# Patient Record
Sex: MALE | Race: WHITE | NOT HISPANIC OR LATINO | URBAN - METROPOLITAN AREA
[De-identification: names, ages, dates, MRNs, and addresses within clinical notes are randomized per-mention and may not be internally consistent; named-entity substitution may affect disease eponyms.]

---

## 2018-11-09 ENCOUNTER — EMERGENCY (EMERGENCY)
Facility: HOSPITAL | Age: 33
LOS: 0 days | Discharge: HOME | End: 2018-11-09
Attending: STUDENT IN AN ORGANIZED HEALTH CARE EDUCATION/TRAINING PROGRAM | Admitting: EMERGENCY MEDICINE
Payer: COMMERCIAL

## 2018-11-09 VITALS
TEMPERATURE: 99 F | DIASTOLIC BLOOD PRESSURE: 60 MMHG | SYSTOLIC BLOOD PRESSURE: 113 MMHG | HEART RATE: 72 BPM | RESPIRATION RATE: 17 BRPM | OXYGEN SATURATION: 97 %

## 2018-11-09 VITALS
HEART RATE: 96 BPM | WEIGHT: 199.96 LBS | DIASTOLIC BLOOD PRESSURE: 76 MMHG | HEIGHT: 72 IN | RESPIRATION RATE: 20 BRPM | TEMPERATURE: 98 F | OXYGEN SATURATION: 98 % | SYSTOLIC BLOOD PRESSURE: 123 MMHG

## 2018-11-09 DIAGNOSIS — L05.91 PILONIDAL CYST WITHOUT ABSCESS: ICD-10-CM

## 2018-11-09 LAB
ABO RH CONFIRMATION: SIGNIFICANT CHANGE UP
ALBUMIN SERPL ELPH-MCNC: 4 G/DL — SIGNIFICANT CHANGE UP (ref 3.3–5)
ALP SERPL-CCNC: 84 U/L — SIGNIFICANT CHANGE UP (ref 40–120)
ALT FLD-CCNC: 25 U/L — SIGNIFICANT CHANGE UP (ref 12–78)
ANION GAP SERPL CALC-SCNC: 6 MMOL/L — SIGNIFICANT CHANGE UP (ref 5–17)
APTT BLD: 36.3 SEC — SIGNIFICANT CHANGE UP (ref 27.5–36.3)
AST SERPL-CCNC: 13 U/L — LOW (ref 15–37)
BASOPHILS # BLD AUTO: 0.06 K/UL — SIGNIFICANT CHANGE UP (ref 0–0.2)
BASOPHILS NFR BLD AUTO: 0.4 % — SIGNIFICANT CHANGE UP (ref 0–2)
BILIRUB SERPL-MCNC: 0.9 MG/DL — SIGNIFICANT CHANGE UP (ref 0.2–1.2)
BLD GP AB SCN SERPL QL: SIGNIFICANT CHANGE UP
BUN SERPL-MCNC: 10 MG/DL — SIGNIFICANT CHANGE UP (ref 7–23)
CALCIUM SERPL-MCNC: 9.4 MG/DL — SIGNIFICANT CHANGE UP (ref 8.5–10.1)
CHLORIDE SERPL-SCNC: 106 MMOL/L — SIGNIFICANT CHANGE UP (ref 96–108)
CO2 SERPL-SCNC: 25 MMOL/L — SIGNIFICANT CHANGE UP (ref 22–31)
CREAT SERPL-MCNC: 1.42 MG/DL — HIGH (ref 0.5–1.3)
EOSINOPHIL # BLD AUTO: 0.19 K/UL — SIGNIFICANT CHANGE UP (ref 0–0.5)
EOSINOPHIL NFR BLD AUTO: 1.2 % — SIGNIFICANT CHANGE UP (ref 0–6)
GLUCOSE SERPL-MCNC: 99 MG/DL — SIGNIFICANT CHANGE UP (ref 70–99)
HCT VFR BLD CALC: 43.6 % — SIGNIFICANT CHANGE UP (ref 39–50)
HGB BLD-MCNC: 15.5 G/DL — SIGNIFICANT CHANGE UP (ref 13–17)
IMM GRANULOCYTES NFR BLD AUTO: 0.5 % — SIGNIFICANT CHANGE UP (ref 0–1.5)
INR BLD: 1.14 RATIO — SIGNIFICANT CHANGE UP (ref 0.88–1.16)
LACTATE SERPL-SCNC: 1.3 MMOL/L — SIGNIFICANT CHANGE UP (ref 0.7–2)
LYMPHOCYTES # BLD AUTO: 20.2 % — SIGNIFICANT CHANGE UP (ref 13–44)
LYMPHOCYTES # BLD AUTO: 3.14 K/UL — SIGNIFICANT CHANGE UP (ref 1–3.3)
MCHC RBC-ENTMCNC: 30.9 PG — SIGNIFICANT CHANGE UP (ref 27–34)
MCHC RBC-ENTMCNC: 35.6 GM/DL — SIGNIFICANT CHANGE UP (ref 32–36)
MCV RBC AUTO: 87 FL — SIGNIFICANT CHANGE UP (ref 80–100)
MONOCYTES # BLD AUTO: 1.51 K/UL — HIGH (ref 0–0.9)
MONOCYTES NFR BLD AUTO: 9.7 % — SIGNIFICANT CHANGE UP (ref 2–14)
NEUTROPHILS # BLD AUTO: 10.53 K/UL — HIGH (ref 1.8–7.4)
NEUTROPHILS NFR BLD AUTO: 68 % — SIGNIFICANT CHANGE UP (ref 43–77)
NRBC # BLD: 0 /100 WBCS — SIGNIFICANT CHANGE UP (ref 0–0)
PLATELET # BLD AUTO: 301 K/UL — SIGNIFICANT CHANGE UP (ref 150–400)
POTASSIUM SERPL-MCNC: 3.8 MMOL/L — SIGNIFICANT CHANGE UP (ref 3.5–5.3)
POTASSIUM SERPL-SCNC: 3.8 MMOL/L — SIGNIFICANT CHANGE UP (ref 3.5–5.3)
PROT SERPL-MCNC: 8.3 GM/DL — SIGNIFICANT CHANGE UP (ref 6–8.3)
PROTHROM AB SERPL-ACNC: 12.7 SEC — SIGNIFICANT CHANGE UP (ref 10–12.9)
RBC # BLD: 5.01 M/UL — SIGNIFICANT CHANGE UP (ref 4.2–5.8)
RBC # FLD: 11.9 % — SIGNIFICANT CHANGE UP (ref 10.3–14.5)
SODIUM SERPL-SCNC: 137 MMOL/L — SIGNIFICANT CHANGE UP (ref 135–145)
TYPE + AB SCN PNL BLD: SIGNIFICANT CHANGE UP
WBC # BLD: 15.51 K/UL — HIGH (ref 3.8–10.5)
WBC # FLD AUTO: 15.51 K/UL — HIGH (ref 3.8–10.5)

## 2018-11-09 PROCEDURE — 74177 CT ABD & PELVIS W/CONTRAST: CPT | Mod: 26

## 2018-11-09 PROCEDURE — 99284 EMERGENCY DEPT VISIT MOD MDM: CPT | Mod: 25

## 2018-11-09 PROCEDURE — XXXXX: CPT

## 2018-11-09 PROCEDURE — 10080 I&D PILONIDAL CYST SIMPLE: CPT

## 2018-11-09 RX ORDER — METRONIDAZOLE 500 MG
500 TABLET ORAL ONCE
Qty: 0 | Refills: 0 | Status: COMPLETED | OUTPATIENT
Start: 2018-11-09 | End: 2018-11-09

## 2018-11-09 RX ORDER — SODIUM CHLORIDE 9 MG/ML
1000 INJECTION INTRAMUSCULAR; INTRAVENOUS; SUBCUTANEOUS ONCE
Qty: 0 | Refills: 0 | Status: COMPLETED | OUTPATIENT
Start: 2018-11-09 | End: 2018-11-09

## 2018-11-09 RX ORDER — OXYCODONE AND ACETAMINOPHEN 5; 325 MG/1; MG/1
1 TABLET ORAL ONCE
Qty: 0 | Refills: 0 | Status: DISCONTINUED | OUTPATIENT
Start: 2018-11-09 | End: 2018-11-09

## 2018-11-09 RX ORDER — MORPHINE SULFATE 50 MG/1
4 CAPSULE, EXTENDED RELEASE ORAL ONCE
Qty: 0 | Refills: 0 | Status: DISCONTINUED | OUTPATIENT
Start: 2018-11-09 | End: 2018-11-09

## 2018-11-09 RX ADMIN — Medication 500 MILLIGRAM(S): at 08:22

## 2018-11-09 RX ADMIN — Medication 100 MILLIGRAM(S): at 07:22

## 2018-11-09 RX ADMIN — SODIUM CHLORIDE 1000 MILLILITER(S): 9 INJECTION INTRAMUSCULAR; INTRAVENOUS; SUBCUTANEOUS at 07:30

## 2018-11-09 RX ADMIN — MORPHINE SULFATE 4 MILLIGRAM(S): 50 CAPSULE, EXTENDED RELEASE ORAL at 06:26

## 2018-11-09 RX ADMIN — MORPHINE SULFATE 4 MILLIGRAM(S): 50 CAPSULE, EXTENDED RELEASE ORAL at 09:11

## 2018-11-09 RX ADMIN — SODIUM CHLORIDE 1000 MILLILITER(S): 9 INJECTION INTRAMUSCULAR; INTRAVENOUS; SUBCUTANEOUS at 07:07

## 2018-11-09 RX ADMIN — MORPHINE SULFATE 4 MILLIGRAM(S): 50 CAPSULE, EXTENDED RELEASE ORAL at 06:40

## 2018-11-09 RX ADMIN — SODIUM CHLORIDE 1000 MILLILITER(S): 9 INJECTION INTRAMUSCULAR; INTRAVENOUS; SUBCUTANEOUS at 06:26

## 2018-11-09 RX ADMIN — SODIUM CHLORIDE 1000 MILLILITER(S): 9 INJECTION INTRAMUSCULAR; INTRAVENOUS; SUBCUTANEOUS at 07:10

## 2018-11-09 RX ADMIN — SODIUM CHLORIDE 1000 MILLILITER(S): 9 INJECTION INTRAMUSCULAR; INTRAVENOUS; SUBCUTANEOUS at 08:40

## 2018-11-09 RX ADMIN — MORPHINE SULFATE 4 MILLIGRAM(S): 50 CAPSULE, EXTENDED RELEASE ORAL at 09:26

## 2018-11-09 NOTE — ED PROCEDURE NOTE - PROCEDURE ADDITIONAL DETAILS
Left side  buttock Pilonidal Cyst: Indurated and fluctuant,  Betadine prep then saline, Lido 1% with epi infiltrated , approximately 2.5 cm incision with #11 blade large amount of pus and bloody drainage, forcep used to break up loculations , 1/2 inch Iodoform packing inserted, with DSD applied. MTangredi NP

## 2018-11-09 NOTE — ED ADULT NURSE REASSESSMENT NOTE - NS ED NURSE REASSESS COMMENT FT1
report received from previous RN. patient laying comfortably on his side in stretcher in no acute distress. A&Ox4. complains of left upper buttock draining abscess. denies any fevers. afebrile at this time. other VSS. color good. skin warm and dry. respirations even and unlabored. states pain has improved after receiving medication - now a 7/10 - patient states "I was crying when I got here and now I'm not so that's a good sign". awaiting CT results. patient remains NPO. blood cultures previously drawn and IV antibiotics started as ordered.

## 2018-11-09 NOTE — ED ADULT NURSE REASSESSMENT NOTE - NS ED NURSE REASSESS COMMENT FT1
patient cleared for discharge. refused d/c VS. irena removed. pressure dressing applied. site benign. discharge instructions provided to patient who verbalized understanding. instructed to follow up with PMD. educated about new medication and verbalized understanding. patient ambulated out with a steady gait with all belongings in no acute distress.

## 2018-11-09 NOTE — ED ADULT NURSE NOTE - OBJECTIVE STATEMENT
Pt presents to ED complaining of worsening pain in left buttocks/tailbone due to cyst reoccurrence. Pt states he had last cyst drained when he was 19 years old out patient.  Pt states he had appointment with dermatologist this am but pain became unbearable and could not sit on coccyx. Pt presents to ED complaining of worsening pain in left buttocks/tailbone due to cyst reoccurrence. Pt states he had last cyst drained when he was 19 years old out patient.  Pt states he had appointment with dermatologist this am but pain became unbearable and could not sit on coccyx.  Denies fever, chills.  Did not take any pain medication PTA

## 2018-11-09 NOTE — ED PROVIDER NOTE - SKIN, MLM
Skin normal color for race, warm, dry and intact. (+) 5x2cm gluteal abscess with surrounding cellulitis

## 2018-11-09 NOTE — CONSULT NOTE ADULT - SUBJECTIVE AND OBJECTIVE BOX
Pt is a 34 yo male with hx of pilonidal cyst at age 16; patient reports always has had "cyst" in that area but worsening in last "36 hours" per patient; patient was going to see dermatologist this am but could not get into car due to pain; did not take anything prior to arrival for pain; no fever or chills; no drainage from site; no abdominal pain; no urinary complaints; no testicular pain; no rectal bleeding; no allergies to medications.    pt admits to driving around town yesterday for approximately 10 hours.  He states that he had the cyst drained once before when he was 19 years old.      PMH: pilonidal cyst   PSH: I&d of pilonidal cyst   ALL: NKDA     VITALS:   ICU Vital Signs Last 24 Hrs  T(C): 37.4 (09 Nov 2018 07:26), Max: 37.4 (09 Nov 2018 07:26)  T(F): 99.4 (09 Nov 2018 07:26), Max: 99.4 (09 Nov 2018 07:26)  HR: 72 (09 Nov 2018 07:26) (72 - 96)  BP: 113/60 (09 Nov 2018 07:26) (113/60 - 123/76)  BP(mean): --  ABP: --  ABP(mean): --  RR: 17 (09 Nov 2018 07:26) (17 - 20)  SpO2: 97% (09 Nov 2018 07:26) (97% - 98%)                            15.5   15.51 )-----------( 301      ( 09 Nov 2018 06:24 )             43.6     11-09    137  |  106  |  10  ----------------------------<  99  3.8   |  25  |  1.42<H>    Ca    9.4      09 Nov 2018 06:24    TPro  8.3  /  Alb  4.0  /  TBili  0.9  /  DBili  x   /  AST  13<L>  /  ALT  25  /  AlkPhos  84  11-09      PE:   NEURO: aox3, appears stated age, WDWN   CV: S1S2, RRR   PULM: bilateral air entry, non-labored breathing   ABD: soft ND, NTTD,   EXT: FROM, WWP, pilonidal cyst approximately 3-4 cm in diameter, with areas of fluctuance, erythematous, mild drainage      < from: CT Abdomen and Pelvis w/ IV Cont (11.09.18 @ 07:07) >  EXAM:  CT ABDOMEN AND PELVIS IC                            PROCEDURE DATE:  11/09/2018  IMPRESSION:    Superficial medial left subcutaneous gluteal cleft 3.4 x 4.0 x 4.7 cm   abscess, posterior to the coccyx. No CT evidence for osteomyelitis.       < end of copied text > Pt is a 34 yo male with hx of pilonidal cyst at age 16; patient reports always has had "cyst" in that area but worsening in last "36 hours" per patient; patient was going to see dermatologist this am but could not get into car due to pain; did not take anything prior to arrival for pain; no fever or chills; no drainage from site; no abdominal pain; no urinary complaints; no testicular pain; no rectal bleeding; no allergies to medications.    pt admits to driving around town yesterday for approximately 10 hours.  He states that he had the cyst drained once before when he was 19 years old.    ROS:.  [X] A ten-point review of systems was otherwise negative except as noted.  Systemic:	[ ] Fever	[ ] Chills	[ ] Night sweats    [ ] Fatigue	[ ] Other  [] Cardiovascular:  [] Pulmonary:  [] Renal/Urologic:  [] Gastrointestinal: abdominal pain, vomiting  [] Metabolic:  [] Neurologic:  [] Hematologic:  [] ENT:  [] Ophthalmologic:  [] Musculoskeletal:    [ ] Due to altered mental status/intubation, subjective information were not able to be obtained from the patient. History was obtained, to the extent possible, from review of the chart and collateral sources of information.    PMH: pilonidal cyst   PSH: I&d of pilonidal cyst   ALL: NKDA     VITALS:   ICU Vital Signs Last 24 Hrs  T(C): 37.4 (09 Nov 2018 07:26), Max: 37.4 (09 Nov 2018 07:26)  T(F): 99.4 (09 Nov 2018 07:26), Max: 99.4 (09 Nov 2018 07:26)  HR: 72 (09 Nov 2018 07:26) (72 - 96)  BP: 113/60 (09 Nov 2018 07:26) (113/60 - 123/76)  BP(mean): --  ABP: --  ABP(mean): --  RR: 17 (09 Nov 2018 07:26) (17 - 20)  SpO2: 97% (09 Nov 2018 07:26) (97% - 98%)       Labs:                      15.5   15.51 )-----------( 301      ( 09 Nov 2018 06:24 )             43.6     11-09    137  |  106  |  10  ----------------------------<  99  3.8   |  25  |  1.42<H>    Ca    9.4      09 Nov 2018 06:24    TPro  8.3  /  Alb  4.0  /  TBili  0.9  /  DBili  x   /  AST  13<L>  /  ALT  25  /  AlkPhos  84  11-09      PE:   NEURO: aox3, appears stated age, WDWN   CV: S1S2, RRR   PULM: bilateral air entry, non-labored breathing   ABD: soft ND, NTTD,   EXT: FROM, WWP, pilonidal cyst approximately 3-4 cm in diameter, with areas of fluctuance, erythematous, mild drainage      < from: CT Abdomen and Pelvis w/ IV Cont (11.09.18 @ 07:07) >  EXAM:  CT ABDOMEN AND PELVIS IC                            PROCEDURE DATE:  11/09/2018  IMPRESSION:    Superficial medial left subcutaneous gluteal cleft 3.4 x 4.0 x 4.7 cm   abscess, posterior to the coccyx. No CT evidence for osteomyelitis.       < end of copied text >

## 2018-11-09 NOTE — ED PROVIDER NOTE - PROGRESS NOTE DETAILS
Sachi DO: Patient now with leukocytosis; HR>90; SIRs; sepsis protocol initiated; blood cultures x 2, lactate added; fluids, antibiotics ordered. Sachi DO: Pending CT scan read at this time- surgery consulted pending CT read Sachi DO: I&D performed by PHILIP Caceres; f/u with Dr. Santana next week; sitz baths, antibiotics; pain control; strict return precautions given.

## 2018-11-09 NOTE — CONSULT NOTE ADULT - ATTENDING COMMENTS
Pt seen and examined, agree with above, Pt offered drainage with sedation , he wants local anesthesia and don't want to stay in hospital   ER to drain the collection   daily packing  sitz baths   oral antibiotics  Follow up in office in 1 week.

## 2018-11-09 NOTE — ED ADULT TRIAGE NOTE - CHIEF COMPLAINT QUOTE
c/o painful cyst on tailbone increasing in size. was supposed to see dermatologist this morning but wasn't able to get into car because of his pain. pt with hx of cyst.

## 2018-11-09 NOTE — CONSULT NOTE ADULT - ASSESSMENT
pt is a 34 yo male with pilonidal cyst, infected     -recommend incision and drainage with packing   -recommend daily sitz baths, multiple times a day if possible   -10 day course of abx is suggested   -pt can follow up in surgical clinic next Thursday   -explained benefit of incision and drainage, pt is agreeable to drainage       Case discussed with Dr. Santana

## 2018-11-09 NOTE — ED PROVIDER NOTE - OBJECTIVE STATEMENT
Patient is a 34 yo male with hx of pilonidal cyst at age 16; patient reports always has had "cyst" in that area but worsening in last "36 hours" per patient; patient was going to see dermatologist this am but could not get into car due to pain; did not take anything prior to arrival for pain; no fever or chills; no drainage from site; no abdominal pain; no urinary complaints; no testicular pain; no rectal bleeding; no allergies to medications.

## 2018-11-09 NOTE — ED ADULT NURSE REASSESSMENT NOTE - NS ED NURSE REASSESS COMMENT FT1
patient laying in stretcher. appears uncomfortable. becoming anxious. patient states pain medication helped for a little but now pain is back. patient states "I don't know why it's taking so long". patient awaiting bedside I&D by MD. Dr. Karimi aware.

## 2018-11-14 LAB
CULTURE RESULTS: SIGNIFICANT CHANGE UP
CULTURE RESULTS: SIGNIFICANT CHANGE UP
SPECIMEN SOURCE: SIGNIFICANT CHANGE UP
SPECIMEN SOURCE: SIGNIFICANT CHANGE UP

## 2020-06-17 PROBLEM — Z00.00 ENCOUNTER FOR PREVENTIVE HEALTH EXAMINATION: Status: ACTIVE | Noted: 2020-06-17

## 2020-06-26 ENCOUNTER — APPOINTMENT (OUTPATIENT)
Dept: GASTROENTEROLOGY | Facility: CLINIC | Age: 35
End: 2020-06-26
Payer: SELF-PAY

## 2020-06-26 VITALS
DIASTOLIC BLOOD PRESSURE: 82 MMHG | WEIGHT: 230 LBS | HEART RATE: 102 BPM | SYSTOLIC BLOOD PRESSURE: 132 MMHG | BODY MASS INDEX: 31.15 KG/M2 | HEIGHT: 72 IN

## 2020-06-26 DIAGNOSIS — R19.7 DIARRHEA, UNSPECIFIED: ICD-10-CM

## 2020-06-26 DIAGNOSIS — R10.84 GENERALIZED ABDOMINAL PAIN: ICD-10-CM

## 2020-06-26 DIAGNOSIS — Z78.9 OTHER SPECIFIED HEALTH STATUS: ICD-10-CM

## 2020-06-26 DIAGNOSIS — Z72.0 TOBACCO USE: ICD-10-CM

## 2020-06-26 DIAGNOSIS — Z02.82 ENCOUNTER FOR ADOPTION SERVICES: ICD-10-CM

## 2020-06-26 DIAGNOSIS — K62.5 HEMORRHAGE OF ANUS AND RECTUM: ICD-10-CM

## 2020-06-26 PROCEDURE — 99204 OFFICE O/P NEW MOD 45 MIN: CPT

## 2020-06-28 PROBLEM — Z78.9 KNOWN HEALTH PROBLEMS: NONE: Status: RESOLVED | Noted: 2020-06-26 | Resolved: 2020-06-28

## 2020-06-28 PROBLEM — Z02.82 ADOPTED: Status: ACTIVE | Noted: 2020-06-26

## 2020-06-28 PROBLEM — Z72.0 TOBACCO USE: Status: ACTIVE | Noted: 2020-06-26

## 2020-06-28 NOTE — REVIEW OF SYSTEMS
[As Noted in HPI] : as noted in HPI [Vomiting] : no vomiting [Abdominal Pain] : abdominal pain [Diarrhea] : diarrhea [Negative] : Heme/Lymph

## 2020-06-28 NOTE — HISTORY OF PRESENT ILLNESS
[de-identified] : 33yo male for initial evaluation of diarrhea\par It started about 2 years ago but somewhat worse lately\par He tends to have liquidy stool 6-7 times per day\par He notes rare formed very thin BM\par There is scant brb on upper only intermittently\par No lower abdominal pain, but some epigastric pain\par Has been happening with no clear inciting or mitigating factors\par Only lasts a few minutes

## 2020-06-28 NOTE — ASSESSMENT
[FreeTextEntry1] : 35yo male with chronic abdominal pain\par 1. diarrhea check labs, ?IBD\par consider egd/colonoscopy\par align empirically\par 2. rectal bleeding - likely due to hemorrhoids as does not sound like typical bleeding form colitis\par 3. abdominal pain - ?PUD vs gastritis vs spasm\par brief duration goes again inflammatory process of stomach

## 2020-06-28 NOTE — PHYSICAL EXAM
[Sclera] : the sclera and conjunctiva were normal [PERRL With Normal Accommodation] : pupils were equal in size, round, and reactive to light [General Appearance - Alert] : alert [General Appearance - In No Acute Distress] : in no acute distress [Neck Appearance] : the appearance of the neck was normal [Extraocular Movements] : extraocular movements were intact [Thyroid Diffuse Enlargement] : the thyroid was not enlarged [Neck Cervical Mass (___cm)] : no neck mass was observed [Thyroid Nodule] : there were no palpable thyroid nodules [Jugular Venous Distention Increased] : there was no jugular-venous distention [Auscultation Breath Sounds / Voice Sounds] : lungs were clear to auscultation bilaterally [Heart Sounds] : normal S1 and S2 [Heart Rate And Rhythm] : heart rate was normal and rhythm regular [Heart Sounds Gallop] : no gallops [Murmurs] : no murmurs [Bowel Sounds] : normal bowel sounds [Heart Sounds Pericardial Friction Rub] : no pericardial rub [Abdomen Tenderness] : non-tender [] : no hepato-splenomegaly [Abdomen Soft] : soft [Abnormal Walk] : normal gait [Abdomen Mass (___ Cm)] : no abdominal mass palpated [Nail Clubbing] : no clubbing  or cyanosis of the fingernails [Oriented To Time, Place, And Person] : oriented to person, place, and time [Motor Tone] : muscle strength and tone were normal [Musculoskeletal - Swelling] : no joint swelling seen [Impaired Insight] : insight and judgment were intact [Affect] : the affect was normal

## 2023-03-09 NOTE — ED PROVIDER NOTE - NS ED ATTENDING STATEMENT MOD
Attending Only Paramedian Forehead Flap Text: A decision was made to reconstruct the defect utilizing an interpolation axial flap and a staged reconstruction.  A telfa template was made of the defect.  This telfa template was then used to outline the paramedian forehead pedicle flap.  The donor area for the pedicle flap was then injected with anesthesia.  The flap was excised through the skin and subcutaneous tissue down to the layer of the underlying musculature.  The pedicle flap was carefully excised within this deep plane to maintain its blood supply.  The edges of the donor site were undermined.   The donor site was closed in a primary fashion.  The pedicle was then rotated into position and sutured.  Once the tube was sutured into place, adequate blood supply was confirmed with blanching and refill.  The pedicle was then wrapped with xeroform gauze and dressed appropriately with a telfa and gauze bandage to ensure continued blood supply and protect the attached pedicle.

## 2023-06-21 ENCOUNTER — TELEPHONE (OUTPATIENT)
Age: 38
End: 2023-06-21

## 2023-06-21 NOTE — TELEPHONE ENCOUNTER
Pt is checking his schedule out with his wife. Trying to get him in with Kristina as a New pt- July 27 open right now.      Self Pay pt